# Patient Record
Sex: FEMALE | Race: WHITE | NOT HISPANIC OR LATINO | URBAN - METROPOLITAN AREA
[De-identification: names, ages, dates, MRNs, and addresses within clinical notes are randomized per-mention and may not be internally consistent; named-entity substitution may affect disease eponyms.]

---

## 2020-03-03 ENCOUNTER — OFFICE VISIT - HEALTHEAST (OUTPATIENT)
Dept: CARDIOLOGY | Facility: CLINIC | Age: 50
End: 2020-03-03

## 2020-03-03 DIAGNOSIS — R07.89 ATYPICAL CHEST PAIN: ICD-10-CM

## 2020-03-03 DIAGNOSIS — R00.2 PALPITATIONS: ICD-10-CM

## 2020-03-03 RX ORDER — LANOLIN ALCOHOL/MO/W.PET/CERES
5 CREAM (GRAM) TOPICAL
Status: SHIPPED | COMMUNITY
Start: 2020-03-03

## 2020-03-10 ENCOUNTER — HOSPITAL ENCOUNTER (OUTPATIENT)
Dept: CARDIOLOGY | Facility: CLINIC | Age: 50
Discharge: HOME | End: 2020-03-10
Attending: INTERNAL MEDICINE

## 2020-03-10 DIAGNOSIS — R00.2 PALPITATIONS: ICD-10-CM

## 2020-03-10 DIAGNOSIS — R07.89 ATYPICAL CHEST PAIN: ICD-10-CM

## 2020-03-10 LAB
AORTIC ROOT: 3.5 CM
AORTIC VALVE MEAN VELOCITY: 69.1 CM/S
ASCENDING AORTA: 3.4 CM
AV DIMENSIONLESS INDEX VTI: 0.9
AV MEAN GRADIENT: 2 MMHG
AV PEAK GRADIENT: 4.6 MMHG
AV VALVE AREA: 3.3 CM2
AV VELOCITY RATIO: 0.8
CV BLOOD PRESSURE: NORMAL MMHG
CV ECHO WEIGHT: 190 LBS
CV STRESS CURRENT BP HE: NORMAL
CV STRESS CURRENT HR HE: 104
CV STRESS CURRENT HR HE: 105
CV STRESS CURRENT HR HE: 107
CV STRESS CURRENT HR HE: 109
CV STRESS CURRENT HR HE: 110
CV STRESS CURRENT HR HE: 110
CV STRESS CURRENT HR HE: 112
CV STRESS CURRENT HR HE: 115
CV STRESS CURRENT HR HE: 115
CV STRESS CURRENT HR HE: 116
CV STRESS CURRENT HR HE: 116
CV STRESS CURRENT HR HE: 119
CV STRESS CURRENT HR HE: 124
CV STRESS CURRENT HR HE: 126
CV STRESS CURRENT HR HE: 136
CV STRESS CURRENT HR HE: 136
CV STRESS CURRENT HR HE: 141
CV STRESS CURRENT HR HE: 147
CV STRESS CURRENT HR HE: 147
CV STRESS CURRENT HR HE: 156
CV STRESS CURRENT HR HE: 157
CV STRESS CURRENT HR HE: 78
CV STRESS CURRENT HR HE: 92
CV STRESS CURRENT HR HE: 95
CV STRESS CURRENT HR HE: 96
CV STRESS CURRENT HR HE: 96
CV STRESS CURRENT HR HE: 99
CV STRESS DEVIATION TIME HE: NORMAL
CV STRESS ECHO PERCENT HR HE: NORMAL
CV STRESS EXERCISE STAGE HE: NORMAL
CV STRESS FINAL RESTING BP HE: NORMAL
CV STRESS FINAL RESTING HR HE: 96
CV STRESS MAX HR HE: 158
CV STRESS MAX TREADMILL GRADE HE: 14
CV STRESS MAX TREADMILL SPEED HE: 3.4
CV STRESS PEAK DIA BP HE: NORMAL
CV STRESS PEAK SYS BP HE: NORMAL
CV STRESS PHASE HE: NORMAL
CV STRESS PROTOCOL HE: NORMAL
CV STRESS RESTING PT POSITION HE: NORMAL
CV STRESS RESTING PT POSITION HE: NORMAL
CV STRESS ST DEVIATION AMOUNT HE: NORMAL
CV STRESS ST DEVIATION ELEVATION HE: NORMAL
CV STRESS ST EVELATION AMOUNT HE: NORMAL
CV STRESS TEST TYPE HE: NORMAL
CV STRESS TOTAL STAGE TIME MIN 1 HE: NORMAL
DOP CALC AO PEAK VEL: 107 CM/S
DOP CALC AO VTI: 21.3 CM
DOP CALC LVOT AREA: 3.8 CM2
DOP CALC LVOT DIAMETER: 2.2 CM
DOP CALC LVOT PEAK VEL: 81.2 CM/S
DOP CALC LVOT STROKE VOLUME: 70.7 CM3
DOP CALCLVOT PEAK VEL VTI: 18.6 CM
EJECTION FRACTION: 56 % (ref 55–75)
FRACTIONAL SHORTENING: 29.5 % (ref 28–44)
INTERVENTRICULAR SEPTUM IN END DIASTOLE: 0.9 CM (ref 0.6–0.9)
IVS/PW RATIO: 1
LA AREA 1: 11.7 CM2
LEFT ATRIUM LENGTH: 3.9 CM
LEFT ATRIUM SIZE: 2.8 CM
LEFT ATRIUM TO AORTIC ROOT RATIO: 0.8 NO UNITS
LEFT VENTRICLE CARDIAC OUTPUT: 5.3 L/MIN
LEFT VENTRICLE DIASTOLIC VOLUME: 85 CM3 (ref 46–106)
LEFT VENTRICLE HEART RATE: 75 BPM
LEFT VENTRICLE SYSTOLIC VOLUME: 37 CM3 (ref 14–42)
LEFT VENTRICULAR INTERNAL DIMENSION IN DIASTOLE: 4.4 CM (ref 3.8–5.2)
LEFT VENTRICULAR INTERNAL DIMENSION IN SYSTOLE: 3.1 CM (ref 2.2–3.5)
LEFT VENTRICULAR MASS: 128 G
LEFT VENTRICULAR OUTFLOW TRACT MEAN GRADIENT: 2 MMHG
LEFT VENTRICULAR OUTFLOW TRACT MEAN VELOCITY: 58.2 CM/S
LEFT VENTRICULAR OUTFLOW TRACT PEAK GRADIENT: 3 MMHG
LEFT VENTRICULAR POSTERIOR WALL IN END DIASTOLE: 0.9 CM (ref 0.6–0.9)
MITRAL VALVE E/A RATIO: 1.7
MV AVERAGE E/E' RATIO: 4.8 CM/S
MV DECELERATION TIME: 257 MS
MV E'TISSUE VEL-LAT: 14.9 CM/S
MV E'TISSUE VEL-MED: 14.7 CM/S
MV LATERAL E/E' RATIO: 4.7
MV MEDIAL E/E' RATIO: 4.8
MV PEAK A VELOCITY: 42 CM/S
MV PEAK E VELOCITY: 70.6 CM/S
NUC REST DIASTOLIC VOLUME INDEX: 3040 LBS
PR MAX PG: 1 MMHG
PR PEAK VELOCITY: 54.3 CM/S
RATE PRESSURE PRODUCT: NORMAL
STRESS ECHO BASELINE DIASTOLIC HE: 60
STRESS ECHO BASELINE HR: 92
STRESS ECHO BASELINE SYSTOLIC BP: 102
STRESS ECHO CALCULATED PERCENT HR: 93 %
STRESS ECHO LAST STRESS DIASTOLIC BP: 60
STRESS ECHO LAST STRESS HR: 157
STRESS ECHO LAST STRESS SYSTOLIC BP: 140
STRESS ECHO POST ESTIMATED WORKLOAD: 8.7
STRESS ECHO POST EXERCISE DUR MIN: 7
STRESS ECHO POST EXERCISE DUR SEC: 10
STRESS ECHO TARGET HR: 169
TRICUSPID VALVE ANULAR PLANE SYSTOLIC EXCURSION: 2.6 CM

## 2021-06-04 VITALS
WEIGHT: 190.44 LBS | HEART RATE: 60 BPM | SYSTOLIC BLOOD PRESSURE: 118 MMHG | RESPIRATION RATE: 14 BRPM | DIASTOLIC BLOOD PRESSURE: 60 MMHG | OXYGEN SATURATION: 98 %

## 2021-06-04 VITALS — WEIGHT: 190 LBS

## 2021-06-06 NOTE — PROGRESS NOTES
Consultation - [facility]  Ana Hill,  1970, MRN [id number]    PCP: Joyce Ingram MD, [phone]    Assessment and Plan: Chest pain Syndrome  Palpitations  Hypercholesterolemia  Hx gastric bypass surgery    Recommendations: Based on her symptoms would recommend proceed a 24-hour Holter monitor and attempt to assess and quantitat arrhythmias and recent symptoms.  Arrange for cardiac echo to assess left ventricular function and structural heart disease.  Arrange for exercise stress test to assess for ischemic heart disease as cause of chest pain.  Depending on results may provide additional testing thereafter    Chief Complaint: Palpitations and chest pain    HPI:  We have been requested by Dr. Ingram to evaluate Ana Hill for consultation who is a  49 y.o. year old female for above chief complaint.  Hx: 49-year-old woman who was seen recently in the urgent care center at [facility] in [place].  Patient was seen by Dr. Blackwood because of severity of symptoms had requested patient have her care transferred here.  Patient is without previous heart history does have a family history of hypertension.  No personal history of diabetes mellitus, hypercholesterolemia, or hypertension.  Most recent lipid in 2019 showed an LDL cholesterol 116.  No previous history of stroke or TIA.  No history of cardiac arrhythmia or heart failure.  She has had gastric bypass surgery in the past.   she was transferred from Dr. Molina's office to our emergency room.  She had been complaining of constant chest pain that described as a sharp feeling in her left breast.  Pain had woken her up from sleep and lasted for over half hour.  She was feeling symptoms of palpitation heart pounding.  Palpitations have been recurring over the past 1 to 2 months.  They often wake her up from sleep.  The day of admission she says that she was having recurrent palpitations.  But this time she  experience a chest pain is sharp stabbing shooting pain that she experienced on the left side of the chest.  No dizziness lightheadedness or syncope.  No tachycardia or racing.  She just recently begun to increase her exercise with a biking walking and her grades of stress.  Symptoms lasted until seen in the ER.  Since that time she is not had recurrent symptoms but she says every night she is experiencing the heart palpitation.  No dyspnea orthopnea PND edema stroke TIA    Current Outpatient Medications:      diphenhydrAMINE-acetaminophen (TYLENOL PM)  mg Tab, Take 1 tablet by mouth daily., Disp: , Rfl:      melatonin 3 mg Tab tablet, Take 5 mg by mouth at bedtime as needed., Disp: , Rfl:      multivitamin therapeutic tablet, Take 1 tablet by mouth daily., Disp: , Rfl:      cyanocobalamin 1000 MCG tablet, Take 1,000 mcg by mouth daily., Disp: , Rfl:      ibuprofen (ADVIL,MOTRIN) 200 MG tablet, Take 600 mg by mouth every 6 (six) hours as needed for pain., Disp: , Rfl:   Medical History  There are no active non-hospital problems to display for this patient.    No past medical history on file.    Surgical History  She  has no past surgical history on file.    Social History  Reviewed, and she  reports that she has never smoked. She has never used smokeless tobacco.  Smoking status reviewed.  Social history othrwise not contributory to HPI.  Allergies  No Known Allergies    Family History  Reviwed, father with HTN but no family hx MI  Extended Emergency Contact Information  Primary Emergency Contact: PIPPA TEJADA  Mobile Phone: [phone]  Relation: Child  Secondary Emergency Contact: DECLINED, PER PT  Relation: Declined  Family history otherwise negative or not conributory to HPI.    Psychosocial Needs  Social History     Social History Narrative     Not on file     Additional psychosocial needs reviewed per nursing assessment.    Prior to Admission Medications  (Not in a hospital admission)      Review of  Systems:  Pertinent items are noted in HPI.  A 12 point comprehensive review of systems was negative except as noted.  Review of systems is negative except for HPI  Physical Exam:  Vitals:    03/03/20 0909   BP: 118/60   Pulse: 60   Resp: 14   SpO2: 98%     Head and neck without focal cranial neurologic defects.  JVD not distended.  Carotid upstroke normal without bruit.  External eye exam normal without icterus.  External ear exam normal.  Neck without cervical lymphadenopathy or thyromegaly.  Cardiac: S1-S2 distinct and regular no extra sounds skipped beats or irregular rhythm  Lungs: Clear  Abdomen with normal bowel tones.  Skin without rash, ecchymosis, lesions.  Neuromuscular tone normal.  Peripheral pulse intact and equal.  Joints without swelling or erythema.    Pertinent Labs  Lab Results: personally reviewed.   Admission on 02/28/2020, Discharged on 02/28/2020   Component Date Value     VENTRICULAR RATE 02/28/2020 73      ATRIAL RATE 02/28/2020 73      P-R INTERVAL 02/28/2020 154      QRS DURATION 02/28/2020 88      Q-T INTERVAL 02/28/2020 394      QTC CALCULATION (BEZET) 02/28/2020 434      P Douglas 02/28/2020 62      R AXIS 02/28/2020 59      T AXIS 02/28/2020 43      MUSE DIAGNOSIS 02/28/2020                      Value:Sinus rhythm with occasional Premature ventricular complexes  Otherwise normal ECG  No previous ECGs available  Confirmed by SEE ED PROVIDER NOTE FOR, ECG INTERPRETATION (4000),  KASHMIR POWELL ([id number]) on 2/28/2020 10:54:44 AM       WBC 02/28/2020 6.4      RBC 02/28/2020 4.30      Hemoglobin 02/28/2020 13.2      Hematocrit 02/28/2020 40.9      MCV 02/28/2020 95      MCH 02/28/2020 30.7      MCHC 02/28/2020 32.3      RDW 02/28/2020 12.5      Platelets 02/28/2020 245      MPV 02/28/2020 9.8      Sodium 02/28/2020 141      Potassium 02/28/2020 4.2      Chloride 02/28/2020 107      CO2 02/28/2020 26      Anion Gap, Calculation 02/28/2020 8      Glucose 02/28/2020 87      Calcium 02/28/2020  8.9      BUN 02/28/2020 13      Creatinine 02/28/2020 0.81      GFR MDRD Af Amer 02/28/2020 >60      GFR MDRD Non Af Amer 02/28/2020 >60      Troponin I 02/28/2020 <0.01      Bilirubin, Total 02/28/2020 0.5      Bilirubin, Direct 02/28/2020 0.2      Protein, Total 02/28/2020 7.2      Albumin 02/28/2020 3.8      Alkaline Phosphatase 02/28/2020 70      AST 02/28/2020 19      ALT 02/28/2020 <9      Lipase 02/28/2020 17      D-Dimer, Quant 02/28/2020 0.27      Troponin I 02/28/2020 <0.01      SYSTOLIC BLOOD PRESSURE 02/28/2020 126      DIASTOLIC BLOOD PRESSURE 02/28/2020 61      VENTRICULAR RATE 02/28/2020 64      ATRIAL RATE 02/28/2020 64      P-R INTERVAL 02/28/2020 164      QRS DURATION 02/28/2020 84      Q-T INTERVAL 02/28/2020 404      QTC CALCULATION (BEZET) 02/28/2020 416      P Fleming 02/28/2020 56      R AXIS 02/28/2020 47      T AXIS 02/28/2020 35      MUSE DIAGNOSIS 02/28/2020                      Value:Normal sinus rhythm  Normal ECG  When compared with ECG of 28-FEB-2020 08:23,  Premature ventricular complexes are no longer Present  Confirmed by SEE ED PROVIDER NOTE FOR, ECG INTERPRETATION (4000),  KASHMIR POWELL ([id number]) on 2/28/2020 10:54:44 AM         Pertinent Radiology  Radiology Results: See Report  EKG Results: personally reviewed.  and See Report       Current Outpatient Medications:      diphenhydrAMINE-acetaminophen (TYLENOL PM)  mg Tab, Take 1 tablet by mouth daily., Disp: , Rfl:      melatonin 3 mg Tab tablet, Take 5 mg by mouth at bedtime as needed., Disp: , Rfl:      multivitamin therapeutic tablet, Take 1 tablet by mouth daily., Disp: , Rfl:      cyanocobalamin 1000 MCG tablet, Take 1,000 mcg by mouth daily., Disp: , Rfl:      ibuprofen (ADVIL,MOTRIN) 200 MG tablet, Take 600 mg by mouth every 6 (six) hours as needed for pain., Disp: , Rfl:

## 2023-06-21 ENCOUNTER — TRANSFERRED RECORDS (OUTPATIENT)
Dept: HEALTH INFORMATION MANAGEMENT | Facility: CLINIC | Age: 53
End: 2023-06-21

## 2024-10-03 ENCOUNTER — TRANSFERRED RECORDS (OUTPATIENT)
Dept: HEALTH INFORMATION MANAGEMENT | Facility: CLINIC | Age: 54
End: 2024-10-03

## 2025-04-04 ENCOUNTER — TRANSFERRED RECORDS (OUTPATIENT)
Dept: HEALTH INFORMATION MANAGEMENT | Facility: CLINIC | Age: 55
End: 2025-04-04